# Patient Record
Sex: MALE | Race: WHITE
[De-identification: names, ages, dates, MRNs, and addresses within clinical notes are randomized per-mention and may not be internally consistent; named-entity substitution may affect disease eponyms.]

---

## 2017-05-24 ENCOUNTER — HOSPITAL ENCOUNTER (EMERGENCY)
Dept: HOSPITAL 47 - EC | Age: 27
Discharge: HOME | End: 2017-05-24
Payer: COMMERCIAL

## 2017-05-24 VITALS — SYSTOLIC BLOOD PRESSURE: 123 MMHG | HEART RATE: 69 BPM | DIASTOLIC BLOOD PRESSURE: 70 MMHG

## 2017-05-24 VITALS — RESPIRATION RATE: 18 BRPM | TEMPERATURE: 97.7 F

## 2017-05-24 DIAGNOSIS — F10.20: Primary | ICD-10-CM

## 2017-05-24 DIAGNOSIS — F17.200: ICD-10-CM

## 2017-05-24 PROCEDURE — 99285 EMERGENCY DEPT VISIT HI MDM: CPT

## 2017-05-24 NOTE — ED
Recheck HPI





- General


Chief Complaint: Recheck/Abnormal Lab/Rx


Stated Complaint: Chills/Body Aches


Time Seen by Provider: 05/24/17 16:53


Source: patient, RN notes reviewed, old records reviewed


Mode of arrival: ambulatory


Limitations: no limitations





- History of Present Illness


Initial Comments: 





Physical 26-year-old male presents emergency Department with chief complaint of 

going through alcohol withdrawals.  Patient reports that his last drink was 2 

days ago.  Patient states that he normally drinks about a fifth to 20 beers a 

day.  He reports that he's been a heavy drinker for the past 8 years.  Patient 

reports that he has no plans to follow-up with [facility] or a 8 this time.  

He reports that for the past few days he has been staying in bed.  Patient 

denies any vomiting but feels nauseated.  He denies any fever or chills or any 

other associated symptoms.  Patient states that he is looking for help for 

detox.  He reports he's had no drinks in the past 3 days.  Patient reports this 

may first time of trying to attempt to become sober. Patient denies any recent 

chills, shortness of breath, chest pain, back pain, abdominal pain, nausea 

vomiting, dysuria or hematuria, constipation or diarrhea, headaches or visual 

changes, or any other current symptoms





- Related Data


 Previous Rx's











 Medication  Instructions  Recorded


 


Ondansetron [Zofran ODT] 4 mg PO Q8HR #12 tab 05/24/17


 


chlordiazePOXIDE HCl [Librium] 20 mg PO TID #20 capsule 05/24/17











 Allergies











Allergy/AdvReac Type Severity Reaction Status Date / Time


 


No Known Allergies Allergy   Verified 05/24/17 17:11














Review of Systems


ROS Statement: 


Those systems with pertinent positive or pertinent negative responses have been 

documented in the HPI.





ROS Other: All systems not noted in ROS Statement are negative.





Past Medical History


Additional Past Medical History / Comment(s): hyperkalemia


History of Any Multi-Drug Resistant Organisms: None Reported


Past Surgical History: No Surgical Hx Reported


Past Psychological History: No Psychological Hx Reported


Smoking Status: Current every day smoker


Past Alcohol Use History: Daily


Past Drug Use History: Marijuana





General Exam





- General Exam Comments


Initial Comments: 





26-year-old male.  Patient is on appear to be in any acute distress.  Patient 

has no active signs of withdrawal.  No tremors.


Limitations: no limitations


General appearance: alert, in no apparent distress


Head exam: Present: atraumatic, normocephalic, normal inspection


Eye exam: Present: normal appearance, PERRL, EOMI.  Absent: scleral icterus, 

conjunctival injection, periorbital swelling


ENT exam: Present: normal exam, mucous membranes moist


Neck exam: Present: normal inspection.  Absent: tenderness, meningismus, 

lymphadenopathy


Respiratory exam: Present: normal lung sounds bilaterally.  Absent: respiratory 

distress, wheezes, rales, rhonchi, stridor


Cardiovascular Exam: Present: regular rate, normal rhythm, normal heart sounds.

  Absent: systolic murmur, diastolic murmur, rubs, gallop, clicks


GI/Abdominal exam: Present: soft, normal bowel sounds.  Absent: distended, 

tenderness, guarding, rebound, rigid


Extremities exam: Present: normal inspection, full ROM, normal capillary 

refill.  Absent: tenderness, pedal edema, joint swelling, calf tenderness


Back exam: Present: normal inspection


Neurological exam: Present: alert, oriented X3, CN II-XII intact


Psychiatric exam: Present: normal affect, normal mood


Skin exam: Present: warm, dry, intact, normal color.  Absent: rash





Course





 Vital Signs











  05/24/17





  16:34


 


Temperature 97.7 F


 


Pulse Rate 63


 


Respiratory 18





Rate 


 


Blood Pressure 128/81


 


O2 Sat by Pulse 99





Oximetry 














Medical Decision Making





- Medical Decision Making





This is a 26-year-old male presents emergency Department with chief complaint 

of the health alcohol detox.  He reports last dose 3 days ago.  He states that 

he normally drinks approximately a fifth or 20 beers a day.  He states that his 

first MTP is try to have to become sober.  He states that he has no significant 

plans follow-up with [facility] this time.  I did encourage him that he 

needs to have support systems when going through a difficult time like this.  

Patient will be given Librium on the emergency department.  Patient also be 

discharged with a Librium taper.  Discuss close follow-up with her primary care 

provider as well as the out patient services.  Patient agrees to this.  I 

advised to return to emergency department if any alarming signs or symptoms 

occur.  Patient understands treatment plan will comply.





Disposition


Clinical Impression: 


 History of alcohol dependence





Disposition: HOME SELF-CARE


Condition: Good


Instructions:  Alcohol Dependence (ED), Alcohol Use Disorder (ED)


Additional Instructions: 


Patient advised to follow-up with outpatient treatment plans.  Patient should 

take the medication as prescribed.  Return to the emergency department if any 

alarming signs or symptoms occur.


Prescriptions: 


chlordiazePOXIDE HCl [Librium] 20 mg PO TID #20 capsule


Ondansetron [Zofran ODT] 4 mg PO Q8HR #12 tab


Referrals: 


Karen Felton MD [STAFF PHYSICIAN] - 1-2 days


Time of Disposition: 17:21

## 2020-10-12 ENCOUNTER — HOSPITAL ENCOUNTER (EMERGENCY)
Dept: HOSPITAL 47 - EC | Age: 30
Discharge: HOME | End: 2020-10-12
Payer: COMMERCIAL

## 2020-10-12 VITALS — TEMPERATURE: 99.1 F | DIASTOLIC BLOOD PRESSURE: 86 MMHG | SYSTOLIC BLOOD PRESSURE: 131 MMHG | RESPIRATION RATE: 18 BRPM

## 2020-10-12 VITALS — HEART RATE: 105 BPM

## 2020-10-12 DIAGNOSIS — F17.200: ICD-10-CM

## 2020-10-12 DIAGNOSIS — J32.4: ICD-10-CM

## 2020-10-12 DIAGNOSIS — L03.211: Primary | ICD-10-CM

## 2020-10-12 PROCEDURE — 99283 EMERGENCY DEPT VISIT LOW MDM: CPT

## 2020-10-12 PROCEDURE — 96372 THER/PROPH/DIAG INJ SC/IM: CPT

## 2020-10-12 PROCEDURE — 70486 CT MAXILLOFACIAL W/O DYE: CPT

## 2020-10-12 NOTE — ED
General Adult HPI





- General


Chief complaint: Eye Problems


Stated complaint: dental pain, facial swelling


Time Seen by Provider: 10/12/20 17:33


Source: patient


Mode of arrival: ambulatory





- History of Present Illness


Initial comments: 





Patient is a 29-year-old male presenting to the emergency Department with 

complaints of pain and swelling on the right side of his face that started 

yesterday.  Patient denies any injuries or trauma.  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  Patient states yesterday he 

noticed pain in the right side of his face and then noticed the swelling on the 

right side of his nose extending into the right cheek.  Patient states it is 

painful to the touch.  He denies any dental pain or pain with chewing.  He state

s he does have a bad tooth in the right upper side but states it does not hurt 

has been away for years.  He denies any fevers but does admit to some mild 

nausea.  He did not take anything for his pain.  Patient states a week ago he 

was feeling a little bit congested but that had cleared up.  He denies any other

complaints at this time.  Upon arrival to the ER, patient was tachycardia, 

otherwise vitals are normal.





- Related Data


                                  Previous Rx's











 Medication  Instructions  Recorded


 


Ondansetron [Zofran ODT] 4 mg PO Q8HR #12 tab 05/24/17


 


chlordiazePOXIDE HCl [Librium] 20 mg PO TID #20 capsule 05/24/17


 


Amoxicillin/Potassium Clav 1 tab PO BID 7 Days #14 tab 10/12/20





[Augmentin 875-125 Tablet]  











                                    Allergies











Allergy/AdvReac Type Severity Reaction Status Date / Time


 


No Known Allergies Allergy   Verified 10/12/20 17:22














Review of Systems


ROS Statement: 


Those systems with pertinent positive or pertinent negative responses have been 

documented in the HPI.





ROS Other: All systems not noted in ROS Statement are negative.





Past Medical History


Additional Past Medical History / Comment(s): hyperkalemia


History of Any Multi-Drug Resistant Organisms: None Reported


Past Surgical History: No Surgical Hx Reported


Past Psychological History: No Psychological Hx Reported


Smoking Status: Current every day smoker


Past Alcohol Use History: Occasional


Past Drug Use History: Marijuana





General Exam





- General Exam Comments


Initial Comments: 





GENERAL: 


Patient is well-developed and well-nourished.  Patient is nontoxic and in no 

acute distress.





HEAD: 


Atraumatic, normocephalic.





EYES:


Pupils equal round and reactive to light, extraocular movements intact, sclera 

anicteric, conjunctiva are normal.  Eyelids were unremarkable.





ENT: 


TMs normal, nares patent, oropharynx clear without exudates.  Moist mucous 

membranes.  Patient has some mild to moderate swelling of the right side of the 

cheek over the maxillary sinuses that is tender to the touch, there is some mild

erythema as well.  Patient does have a decayed tooth in the right upper side but

is not painful to the touch, there is no dental abscess present.





NECK: 


Normal range of motion, supple without lymphadenopathy or JVD.





LUNGS:


Unlabored respirations.  Breath sounds clear to auscultation bilaterally and 

equal.  No wheezes rales or rhonchi.





HEART:


Regular rate and rhythm without murmurs, rubs or gallops.





ABDOMEN: 


Soft, nontender, normoactive bowel sounds.  No guarding, no rebound.  No masses 

appreciated.





: Deferred 





MUSCULOSKELETAL: 


Normal extremities with adequate strength and normal range of motion, no pitting

or edema.  No clubbing or cyanosis.





NEUROLOGICAL: 


Patient is alert and oriented x 3.   Symmetrical smile.  Normal speech, normal 

gait.   





PSYCH:


Normal mood, normal affect.





SKIN:


 Warm, Dry, normal turgor, no rashes or lesions noted.





Course


                                   Vital Signs











  10/12/20 10/12/20 10/12/20





  17:19 18:29 19:37


 


Temperature 99.1 F  


 


Pulse Rate 141 H 113 H 105 H


 


Respiratory 18  





Rate   


 


Blood Pressure 131/86  


 


O2 Sat by Pulse 100 98 100





Oximetry   














Medical Decision Making





- Medical Decision Making





Patient is a 29-year-old male here for right-sided facial pain and swelling 

started yesterday.  He denies any dental pain or trouble breathing.  Does admit 

to some sinus congestion.  I did do a CT of his sinuses and reveals evidence for

mild frontal ethmoid and right maxillary sinusitis, right-sided maxillary soft 

tissue swelling.  She was given Toradol for pain relief.  Upon reexamination, 

patient is sleeping in exam room.  I discussed these findings with the patient. 

I will start patient on Augmentin for sinusitis, possible cellulitis.  First 

dose given in the ER.  Patient is agreement with this plan of care and he is 

stable for discharge.  Return parameters were discussed with the patient he 

verbalizes understanding.  Case discussed with Dr. Tomas.





Disposition


Clinical Impression: 


 Sinusitis, acute frontal, Facial cellulitis





Disposition: HOME SELF-CARE


Condition: Stable


Instructions (If sedation given, give patient instructions):  Sinusitis (ED)


Additional Instructions: 


Please return to the Emergency Department if symptoms worsen or any other 

concerns.


CT scan revealed evidence of inflammation in your sinuses, possible cellulitis.


Take antibiotic as directed.  Follow-up with PCP.


Prescriptions: 


Amoxicillin/Potassium Clav [Augmentin 875-125 Tablet] 1 tab PO BID 7 Days #14 

tab


Is patient prescribed a controlled substance at d/c from ED?: No


Referrals: 


None,Stated [Primary Care Provider] - 1-2 days


Oni Rivera MD [REFERRING] - 1-2 days

## 2020-10-12 NOTE — CT
EXAMINATION TYPE: CT sinus wo con

 

DATE OF EXAM: 10/12/2020

 

COMPARISON: None

 

HISTORY: Right sided facial swelling with pain.

 

CT DLP: 374.1 mGycm

Automated exposure control for dose reduction was used.

 

Images were obtained from the bottom of the maxilla to the top of the frontal sinuses without contras
t.

 

There is soft tissue swelling anterior to the right side of the maxilla. There is mild mucosal thicke
salome in the right side maxillary sinus. There is no evidence of a blowout fracture. There is bilatera
l patency of the ostiomeatal complex. There is mild mucosal thickening in the frontal and ethmoid sin
uses. Sphenoid sinus appears fairly normal. There is no evidence of retro-orbital mass. The globes ar
e symmetric. Nasal bone is intact. Maxilla is intact. Zygomatic arches appear normal. I see no bony d
estructive process.

 

IMPRESSION:

There is evidence of some mild frontal ethmoid and right maxillary sinusitis. Right side maxillary so
ft tissue swelling.